# Patient Record
Sex: FEMALE | Race: WHITE | URBAN - METROPOLITAN AREA
[De-identification: names, ages, dates, MRNs, and addresses within clinical notes are randomized per-mention and may not be internally consistent; named-entity substitution may affect disease eponyms.]

---

## 2018-10-03 ENCOUNTER — APPOINTMENT (RX ONLY)
Dept: URBAN - METROPOLITAN AREA CLINIC 98 | Facility: CLINIC | Age: 48
Setting detail: DERMATOLOGY
End: 2018-10-03

## 2018-10-03 DIAGNOSIS — L81.2 FRECKLES: ICD-10-CM

## 2018-10-03 DIAGNOSIS — D22 MELANOCYTIC NEVI: ICD-10-CM

## 2018-10-03 DIAGNOSIS — L85.1 ACQUIRED KERATOSIS [KERATODERMA] PALMARIS ET PLANTARIS: ICD-10-CM

## 2018-10-03 DIAGNOSIS — L57.8 OTHER SKIN CHANGES DUE TO CHRONIC EXPOSURE TO NONIONIZING RADIATION: ICD-10-CM

## 2018-10-03 DIAGNOSIS — L71.0 PERIORAL DERMATITIS: ICD-10-CM

## 2018-10-03 PROBLEM — M12.9 ARTHROPATHY, UNSPECIFIED: Status: ACTIVE | Noted: 2018-10-03

## 2018-10-03 PROBLEM — D22.62 MELANOCYTIC NEVI OF LEFT UPPER LIMB, INCLUDING SHOULDER: Status: ACTIVE | Noted: 2018-10-03

## 2018-10-03 PROBLEM — D22.71 MELANOCYTIC NEVI OF RIGHT LOWER LIMB, INCLUDING HIP: Status: ACTIVE | Noted: 2018-10-03

## 2018-10-03 PROBLEM — D22.72 MELANOCYTIC NEVI OF LEFT LOWER LIMB, INCLUDING HIP: Status: ACTIVE | Noted: 2018-10-03

## 2018-10-03 PROBLEM — D22.61 MELANOCYTIC NEVI OF RIGHT UPPER LIMB, INCLUDING SHOULDER: Status: ACTIVE | Noted: 2018-10-03

## 2018-10-03 PROBLEM — L70.0 ACNE VULGARIS: Status: ACTIVE | Noted: 2018-10-03

## 2018-10-03 PROBLEM — D22.5 MELANOCYTIC NEVI OF TRUNK: Status: ACTIVE | Noted: 2018-10-03

## 2018-10-03 PROBLEM — L57.0 ACTINIC KERATOSIS: Status: ACTIVE | Noted: 2018-10-03

## 2018-10-03 PROCEDURE — ? SUNSCREEN RECOMMENDATIONS

## 2018-10-03 PROCEDURE — ? OBSERVATION

## 2018-10-03 PROCEDURE — ? TREATMENT REGIMEN

## 2018-10-03 PROCEDURE — 99214 OFFICE O/P EST MOD 30 MIN: CPT

## 2018-10-03 PROCEDURE — ? PRESCRIPTION

## 2018-10-03 PROCEDURE — ? COUNSELING

## 2018-10-03 RX ORDER — UREA 40 G/100G
CREAM TOPICAL
Qty: 1 | Refills: 7 | Status: ERX | COMMUNITY
Start: 2018-10-03

## 2018-10-03 RX ORDER — CLINDAMYCIN PHOSPHATE 10 MG/ML
SOLUTION TOPICAL
Qty: 1 | Refills: 0 | Status: ERX | COMMUNITY
Start: 2018-10-03

## 2018-10-03 RX ORDER — MINOCYCLINE HYDROCHLORIDE 100 MG/1
CAPSULE ORAL
Qty: 28 | Refills: 0 | Status: ERX | COMMUNITY
Start: 2018-10-03

## 2018-10-03 RX ADMIN — MINOCYCLINE HYDROCHLORIDE: 100 CAPSULE ORAL at 13:21

## 2018-10-03 RX ADMIN — CLINDAMYCIN PHOSPHATE: 10 SOLUTION TOPICAL at 13:17

## 2018-10-03 RX ADMIN — UREA: 40 CREAM TOPICAL at 13:23

## 2018-10-03 ASSESSMENT — LOCATION ZONE DERM
LOCATION ZONE: FACE
LOCATION ZONE: ARM
LOCATION ZONE: NECK
LOCATION ZONE: TRUNK
LOCATION ZONE: FEET
LOCATION ZONE: LEG

## 2018-10-03 ASSESSMENT — LOCATION SIMPLE DESCRIPTION DERM
LOCATION SIMPLE: RIGHT SHOULDER
LOCATION SIMPLE: RIGHT FOREHEAD
LOCATION SIMPLE: RIGHT FOREARM
LOCATION SIMPLE: LEFT PRETIBIAL REGION
LOCATION SIMPLE: LEFT POSTERIOR UPPER ARM
LOCATION SIMPLE: RIGHT ANTERIOR NECK
LOCATION SIMPLE: CHEST
LOCATION SIMPLE: LEFT CALF
LOCATION SIMPLE: LEFT THIGH
LOCATION SIMPLE: LEFT CHEEK
LOCATION SIMPLE: RIGHT CHEEK
LOCATION SIMPLE: LEFT FOREARM
LOCATION SIMPLE: RIGHT PLANTAR SURFACE
LOCATION SIMPLE: RIGHT UPPER BACK
LOCATION SIMPLE: LEFT UPPER BACK
LOCATION SIMPLE: ABDOMEN
LOCATION SIMPLE: LEFT ANTERIOR NECK
LOCATION SIMPLE: RIGHT PRETIBIAL REGION
LOCATION SIMPLE: LEFT PLANTAR SURFACE
LOCATION SIMPLE: LEFT SHOULDER
LOCATION SIMPLE: RIGHT POSTERIOR UPPER ARM
LOCATION SIMPLE: RIGHT THIGH
LOCATION SIMPLE: LEFT FOOT

## 2018-10-03 ASSESSMENT — LOCATION DETAILED DESCRIPTION DERM
LOCATION DETAILED: RIGHT INSTEP
LOCATION DETAILED: RIGHT DISTAL POSTERIOR UPPER ARM
LOCATION DETAILED: RIGHT PROXIMAL RADIAL DORSAL FOREARM
LOCATION DETAILED: RIGHT INFERIOR CENTRAL MALAR CHEEK
LOCATION DETAILED: RIGHT DISTAL PRETIBIAL REGION
LOCATION DETAILED: LEFT DISTAL PRETIBIAL REGION
LOCATION DETAILED: LEFT POSTERIOR SHOULDER
LOCATION DETAILED: LEFT INFERIOR CENTRAL MALAR CHEEK
LOCATION DETAILED: RIGHT MID-UPPER BACK
LOCATION DETAILED: LEFT LATERAL PLANTAR MIDFOOT
LOCATION DETAILED: UPPER STERNUM
LOCATION DETAILED: RIGHT INFERIOR LATERAL NECK
LOCATION DETAILED: RIGHT SUPERIOR UPPER BACK
LOCATION DETAILED: LEFT PROXIMAL PRETIBIAL REGION
LOCATION DETAILED: RIGHT MEDIAL SUPERIOR CHEST
LOCATION DETAILED: RIGHT PROXIMAL POSTERIOR UPPER ARM
LOCATION DETAILED: LEFT INFERIOR MEDIAL UPPER BACK
LOCATION DETAILED: LEFT PROXIMAL DORSAL FOREARM
LOCATION DETAILED: RIGHT POSTERIOR SHOULDER
LOCATION DETAILED: LEFT SUPERIOR UPPER BACK
LOCATION DETAILED: LEFT INFERIOR ANTERIOR NECK
LOCATION DETAILED: RIGHT INFERIOR MEDIAL MALAR CHEEK
LOCATION DETAILED: RIGHT SUPERIOR MEDIAL BUCCAL CHEEK
LOCATION DETAILED: LEFT PROXIMAL CALF
LOCATION DETAILED: LEFT LATERAL SUPERIOR CHEST
LOCATION DETAILED: RIGHT ANTERIOR DISTAL THIGH
LOCATION DETAILED: RIGHT MEDIAL FOREHEAD
LOCATION DETAILED: LEFT ANTERIOR SHOULDER
LOCATION DETAILED: LEFT DISTAL DORSAL FOREARM
LOCATION DETAILED: LEFT DORSAL FOOT
LOCATION DETAILED: LEFT PROXIMAL POSTERIOR UPPER ARM
LOCATION DETAILED: RIGHT RIB CAGE
LOCATION DETAILED: LEFT ANTERIOR DISTAL THIGH

## 2019-04-02 ENCOUNTER — OFFICE VISIT (OUTPATIENT)
Dept: URGENT CARE | Facility: CLINIC | Age: 49
End: 2019-04-02
Payer: COMMERCIAL

## 2019-04-02 VITALS
WEIGHT: 160 LBS | SYSTOLIC BLOOD PRESSURE: 111 MMHG | HEART RATE: 74 BPM | TEMPERATURE: 98 F | OXYGEN SATURATION: 100 % | BODY MASS INDEX: 22.4 KG/M2 | RESPIRATION RATE: 18 BRPM | DIASTOLIC BLOOD PRESSURE: 78 MMHG | HEIGHT: 71 IN

## 2019-04-02 DIAGNOSIS — M70.62 TROCHANTERIC BURSITIS OF LEFT HIP: Primary | ICD-10-CM

## 2019-04-02 PROCEDURE — 3008F PR BODY MASS INDEX (BMI) DOCUMENTED: ICD-10-PCS | Mod: CPTII,S$GLB,, | Performed by: FAMILY MEDICINE

## 2019-04-02 PROCEDURE — 3008F BODY MASS INDEX DOCD: CPT | Mod: CPTII,S$GLB,, | Performed by: FAMILY MEDICINE

## 2019-04-02 PROCEDURE — 99203 OFFICE O/P NEW LOW 30 MIN: CPT | Mod: S$GLB,,, | Performed by: FAMILY MEDICINE

## 2019-04-02 PROCEDURE — 99203 PR OFFICE/OUTPT VISIT, NEW, LEVL III, 30-44 MIN: ICD-10-PCS | Mod: S$GLB,,, | Performed by: FAMILY MEDICINE

## 2019-04-02 NOTE — PATIENT INSTRUCTIONS
Understanding Trochanteric Bursitis    A bursa is a thin, slippery, sac-like film. It contains a small amount of fluid. This structure is found between bones and soft tissues in and around joints. A bursa cushions and protects a joint. It keeps parts of a joint from rubbing against each other. If a bursa becomes inflamed and irritated, it is known as bursitis.  The trochanteric bursa is found on the hip joint. It lies on top of the bump at the top of the thighbone called the greater trochanter. Inflammation of this bursa is called trochanteric bursitis.     How to say it  [id number]   Causes of trochanteric bursitis  Causes may include:  · Overuse of the hip during running or other sports, dance, or work  · Falling on or irritation to the side of the hip  This condition may occur along with other problems, such as osteoarthritis of the hip or knee, or low back problems. In rare cases, it may occur after hip surgery.  Symptoms of trochanteric bursitis  · Pain or aching on the side of the hip. The pain may travel down the leg.  · Swelling, tenderness, or warmth on the side of the hip at the bony bump at the top of the thigh  Treatment for trochanteric bursitis  These may include:  · Resting the hip. This allows the bursa to heal.  · Prescription or over-the-counter pain medicines. These help reduce inflammation, swelling, and pain.  · Cold packs and heat packs. These help reduce pain and swelling.  · Stretching and strengthening exercises. These improve flexibility and strength around the hip.  · Physical therapy. This includes exercises or other treatments.  · Injections of medicine into the bursa. This may help reduce inflammation and relieve symptoms.  Possible complications  If you dont give your hip time to heal, the problem may not go away, may return, or may get worse. Rest and treat your hip as directed.     When to call your healthcare provider  Call your healthcare provider right away if you have  any of these:  · Fever of 100.4°F (38°C) or higher, or as directed  · Redness, swelling, or warmth that gets worse  · Symptoms that dont get better with prescribed medicines, or get worse  · New symptoms   Date Last Reviewed: 3/29/2016  © 7930-4961 [employer], [place]. [address] [place] [address], [place] [address]. All rights reserved. This information is not intended as a substitute for professional medical care. Always follow your healthcare professional's instructions.      CALL TODAY FOR AN APPOINTMENT WITH YOUR RHEUMATOLOGIST IN ABOUT 2 WEEKS, AS A STEROID INJECTION MAY BE HELPFUL IF YOU'RE NOT IMPROVING AS EXPECTED.    CONTINUE THE MOBIC ONCE DAILY    Make sure that you follow up with your primary care doctor in the next 2-5 days if needed .  Return to the Urgent Care if signs or symptoms change and certainly if you have worsening symptoms go to the nearest emergency department for further evaluation.

## 2019-04-02 NOTE — PROGRESS NOTES
"Subjective:       Patient ID: Beata Rivero is a 48 y.o. female.    Vitals:  height is 5' 11" (1.803 m) and weight is 72.6 kg (160 lb). Her temperature is 98.3 °F (36.8 °C). Her blood pressure is 111/78 and her pulse is 74. Her respiration is 18 and oxygen saturation is 100%.     Chief Complaint: Hip Pain (Started Sunday)    Patient is experiencing left hip pain. Started 2 days ago.  Patient states pain in relieved when she sits down. Patient denies trauma.  She does have a history of rheumatoid arthritis and is established with a rheumatologist    Hip Pain    The incident occurred 3 to 5 days ago (started sunday). There was no injury mechanism. The pain is present in the left hip. The quality of the pain is described as aching and shooting. The pain is at a severity of 7/10. The pain is moderate. The pain has been constant since onset. She reports no foreign bodies present. The symptoms are aggravated by movement. Treatments tried: ibuprofen. The treatment provided mild relief.       Constitution: Negative for chills, fatigue and fever.   HENT: Negative for congestion and sore throat.    Neck: Negative for painful lymph nodes.   Cardiovascular: Negative for chest pain and leg swelling.   Eyes: Negative for double vision and blurred vision.   Respiratory: Negative for cough and shortness of breath.    Gastrointestinal: Negative for nausea, vomiting and diarrhea.   Genitourinary: Negative for dysuria, frequency, urgency and history of kidney stones.   Musculoskeletal: Positive for pain. Negative for joint pain, joint swelling, muscle cramps and muscle ache.        Left hip pain   Skin: Negative for color change, pale, rash and bruising.   Allergic/Immunologic: Negative for seasonal allergies.   Neurological: Negative for dizziness, history of vertigo, light-headedness, passing out and headaches.   Hematologic/Lymphatic: Negative for swollen lymph nodes.   Psychiatric/Behavioral: Negative for " nervous/anxious, sleep disturbance and depression. The patient is not nervous/anxious.        Objective:      Physical Exam   Constitutional: She is oriented to person, place, and time. She appears well-developed and well-nourished. She is cooperative.  Non-toxic appearance. She does not appear ill. No distress.   HENT:   Head: Normocephalic and atraumatic.   Right Ear: Hearing, tympanic membrane and ear canal normal.   Left Ear: Hearing, tympanic membrane and ear canal normal.   Nose: Nose normal. No mucosal edema, rhinorrhea or nasal deformity. No epistaxis. Right sinus exhibits no maxillary sinus tenderness and no frontal sinus tenderness. Left sinus exhibits no maxillary sinus tenderness and no frontal sinus tenderness.   Mouth/Throat: Uvula is midline, oropharynx is clear and moist and mucous membranes are normal. No trismus in the jaw. Normal dentition. No uvula swelling. No posterior oropharyngeal erythema.   Eyes: Conjunctivae and lids are normal. Right eye exhibits no discharge. Left eye exhibits no discharge. No scleral icterus.   Sclera clear bilat   Neck: Normal range of motion, full passive range of motion without pain and phonation normal. Neck supple.   Cardiovascular: Normal rate, regular rhythm, normal heart sounds, intact distal pulses and normal pulses.   No murmur heard.  Pulmonary/Chest: Effort normal and breath sounds normal. No stridor. No respiratory distress. She has no wheezes. She has no rales.   Abdominal: Normal appearance. She exhibits no pulsatile midline mass.   Musculoskeletal: Normal range of motion. She exhibits no edema or deformity.   Slight limp when walking.  No pain at rest, when seated.  Point tenderness over left greater trochanter.  No pain with internal or external rotation of left hip.   Lymphadenopathy:     She has no cervical adenopathy.   Neurological: She is alert and oriented to person, place, and time. She exhibits normal muscle tone. Coordination normal.   Skin:  Skin is warm, dry and intact. She is not diaphoretic. No pallor.   Psychiatric: She has a normal mood and affect. Her speech is normal and behavior is normal. Judgment and thought content normal. Cognition and memory are normal.   Nursing note and vitals reviewed.      Assessment:       1. Trochanteric bursitis of left hip        Plan:         Trochanteric bursitis of left hip    CALL TODAY FOR AN APPOINTMENT WITH YOUR RHEUMATOLOGIST IN ABOUT 2 WEEKS, AS A STEROID INJECTION MAY BE HELPFUL IF YOU'RE NOT IMPROVING AS EXPECTED.    CONTINUE THE MOBIC ONCE DAILY    Make sure that you follow up with your primary care doctor in the next 2-5 days if needed .  Return to the Urgent Care if signs or symptoms change and certainly if you have worsening symptoms go to the nearest emergency department for further evaluation.

## 2019-07-17 ENCOUNTER — OFFICE VISIT (OUTPATIENT)
Dept: CARDIOLOGY | Facility: CLINIC | Age: 49
End: 2019-07-17
Payer: COMMERCIAL

## 2019-07-17 VITALS
HEIGHT: 71 IN | SYSTOLIC BLOOD PRESSURE: 109 MMHG | DIASTOLIC BLOOD PRESSURE: 68 MMHG | HEART RATE: 76 BPM | BODY MASS INDEX: 23.17 KG/M2 | WEIGHT: 165.5 LBS

## 2019-07-17 DIAGNOSIS — R00.2 PALPITATIONS: Primary | ICD-10-CM

## 2019-07-17 DIAGNOSIS — R94.31 NONSPECIFIC ABNORMAL ELECTROCARDIOGRAM (ECG) (EKG): ICD-10-CM

## 2019-07-17 PROCEDURE — 3008F PR BODY MASS INDEX (BMI) DOCUMENTED: ICD-10-PCS | Mod: CPTII,S$GLB,, | Performed by: INTERNAL MEDICINE

## 2019-07-17 PROCEDURE — 3008F BODY MASS INDEX DOCD: CPT | Mod: CPTII,S$GLB,, | Performed by: INTERNAL MEDICINE

## 2019-07-17 PROCEDURE — 99205 PR OFFICE/OUTPT VISIT, NEW, LEVL V, 60-74 MIN: ICD-10-PCS | Mod: S$GLB,,, | Performed by: INTERNAL MEDICINE

## 2019-07-17 PROCEDURE — 99205 OFFICE O/P NEW HI 60 MIN: CPT | Mod: S$GLB,,, | Performed by: INTERNAL MEDICINE

## 2019-07-17 RX ORDER — ME-TETRAHYDROFOLATE/B12/HRB236 1-1-500 MG
1 CAPSULE ORAL DAILY
Refills: 3 | COMMUNITY
Start: 2019-04-15

## 2019-07-17 RX ORDER — CETIRIZINE HYDROCHLORIDE 10 MG/1
10 TABLET ORAL DAILY
COMMUNITY

## 2019-07-17 RX ORDER — VILAZODONE HYDROCHLORIDE 10 MG/1
TABLET ORAL
COMMUNITY
Start: 2019-07-14

## 2019-07-17 NOTE — PROGRESS NOTES
"  Subjective:      Patient ID: Beata Rivero is a 48 y.o. female.    Chief Complaint: Palpitations (Ref by Dr Slater)    HPI:  "I have been having weird heart beats for the past month.  It feels like my heart skips a beat.  Sometimes I notice it at night.  I do not notice it when exercising."  There is no hx of sustained fast heart beats.  "It feels like my heart is pounding."      Pt is a  at [employer].    Pt does cardio with a .    There is no hx of chest pain or shortness of breath or palpitations during exercise.      Review of Systems   Cardiovascular: Positive for irregular heartbeat and palpitations. Negative for chest pain, claudication, dyspnea on exertion, leg swelling, near-syncope, orthopnea and syncope.      Pt once fainted as a teenager after a bad sunburn.    Pt sees Dr Tahmina Mock for rheumatoid arthritis.  Pt has a hx of juvenile rheumatoid arthritis.  Pt has had a hx of pleurisy a few times attributed to RA  Past Medical History:   Diagnosis Date    Pleurisy     Rheumatoid arthritis     Syncope and collapse     once as a teenager        Past Surgical History:   Procedure Laterality Date    CERVICAL DISCECTOMY      KNEE ARTHROPLASTY      TOTAL KNEE ARTHROPLASTY         Family History   Problem Relation Age of Onset    No Known Problems Mother     No Known Problems Father        Social History     Socioeconomic History    Marital status:      Spouse name: Not on file    Number of children: Not on file    Years of education: Not on file    Highest education level: Not on file   Occupational History    Not on file   Social Needs    Financial resource strain: Not on file    Food insecurity:     Worry: Not on file     Inability: Not on file    Transportation needs:     Medical: Not on file     Non-medical: Not on file   Tobacco Use    Smoking status: Never Smoker   Substance and Sexual Activity    Alcohol use: Yes     " Alcohol/week: 4.2 oz     Types: 7 Glasses of wine per week    Drug use: Not on file    Sexual activity: Not on file   Lifestyle    Physical activity:     Days per week: Not on file     Minutes per session: Not on file    Stress: Not on file   Relationships    Social connections:     Talks on phone: Not on file     Gets together: Not on file     Attends Zoroastrian service: Not on file     Active member of club or organization: Not on file     Attends meetings of clubs or organizations: Not on file     Relationship status: Not on file   Other Topics Concern    Not on file   Social History Narrative    Not on file       Current Outpatient Medications on File Prior to Visit   Medication Sig Dispense Refill    cetirizine (ZYRTEC) 10 MG tablet Take 10 mg by mouth once daily.      cyclobenzaprine (FLEXERIL) 10 MG tablet Take 5 mg by mouth every evening.      levonorgestrel (MIRENA) 20 mcg/24 hr (5 years) IUD 1 each by Intrauterine route once.      methotrexate 2.5 MG Tab Take 22.5 mg by mouth every 7 days.      multivitamin capsule Take 1 capsule by mouth once daily.      RHEUMATE 1-1-500 mg Cap Take 1 capsule by mouth once daily.  3    valacyclovir (VALTREX) 500 MG tablet Take 500 mg by mouth once daily.      VIIBRYD 10 mg Tab tablet       [DISCONTINUED] buPROPion (WELLBUTRIN XL) 150 MG TB24 tablet Take 150 mg by mouth once daily.      [DISCONTINUED] CALCIUM CARBONATE/VITAMIN D3 (CALCIUM 500 + D ORAL) Take by mouth.      [DISCONTINUED] folic acid (FOLVITE) 1 MG tablet Take 3 mg by mouth once daily.      [DISCONTINUED] meloxicam (MOBIC) 15 MG tablet Take 15 mg by mouth once daily. When not taking anaprox      [DISCONTINUED] naproxen sodium (ANAPROX) 220 MG tablet Take 220 mg by mouth every 12 (twelve) hours. 4 times daily       No current facility-administered medications on file prior to visit.        Review of patient's allergies indicates:  No Known Allergies  Objective:     Vitals:    07/17/19 1430  "  BP: 109/68   BP Location: Left arm   Patient Position: Sitting   BP Method: Medium (Automatic)   Pulse: 76   Weight: 75.1 kg (165 lb 8 oz)   Height: 5' 11" (1.803 m)        Physical Exam   Constitutional: She is oriented to person, place, and time. She appears well-developed and well-nourished.   Eyes: No scleral icterus.   Neck: No JVD present. Carotid bruit is not present.   Cardiovascular: Normal rate and regular rhythm. Exam reveals no gallop.   No murmur heard.  Pulses:       Dorsalis pedis pulses are 2+ on the right side, and 2+ on the left side.   Pulmonary/Chest: Breath sounds normal.   Abdominal: Soft. She exhibits no pulsatile midline mass and no mass. There is no hepatosplenomegaly. There is no tenderness.   Musculoskeletal: She exhibits no edema.   Neurological: She is alert and oriented to person, place, and time.   Skin: Skin is warm and dry.   Psychiatric: She has a normal mood and affect. Her behavior is normal. Judgment and thought content normal.   Vitals reviewed.     ECG; NSR, left axis deviation    Lab 7/10/19:  CBC and CMP and TSH WNL    Note CT chest 2014: heart and lungs and pericardium WNL  Assessment:     1. Palpitations    2. Nonspecific abnormal electrocardiogram (ECG) (EKG)      Plan:   Beata was seen today for palpitations.    Diagnoses and all orders for this visit:    Palpitations  -     Transthoracic echo (TTE) 2D with Color Flow; Future    Nonspecific abnormal electrocardiogram (ECG) (EKG)  -     Transthoracic echo (TTE) 2D with Color Flow; Future     Palpitations may be due to PAC's or PVC's which would typically not require treatment    Holter monitor to evaluate palpitations    Echocardiogram     Consider treadmill stress test    Will review most recent labs done by Dr Mock    Follow up in about 6 months (around 1/17/2020).  "

## 2019-07-17 NOTE — LETTER
July 17, 2019      Beata Slater MD  [address] [facility] [address]           [place] [facility]  [address] [place], [address]  [facility] [address]  Phone: [phone]  Fax: [phone]          Patient: Beata Rivero   MR Number: [id number]   YOB: 1970   Date of Visit: 7/17/2019       Dear Dr. Beata Slater:    Thank you for referring Beata Rivero to me for evaluation. Attached you will find relevant portions of my assessment and plan of care.    If you have questions, please do not hesitate to call me. I look forward to following Beata Rivero along with you.    Sincerely,    Benjamin Torres MD    Enclosure  CC:  No Recipients    If you would like to receive this communication electronically, please contact [email or web address] or [phone] to request more information on [employer] Link access.    For providers and/or their staff who would like to refer a patient to [facility], please contact us through our one-stop-shop provider referral line, [facility], at [phone].    If you feel you have received this communication in error or would no longer like to receive these types of communications, please e-mail [email or web address]

## 2019-07-18 ENCOUNTER — HOSPITAL ENCOUNTER (OUTPATIENT)
Dept: CARDIOLOGY | Facility: OTHER | Age: 49
Discharge: HOME | End: 2019-07-18
Attending: INTERNAL MEDICINE
Payer: COMMERCIAL

## 2019-07-18 ENCOUNTER — TELEPHONE (OUTPATIENT)
Dept: CARDIOLOGY | Facility: CLINIC | Age: 49
End: 2019-07-18

## 2019-07-18 ENCOUNTER — CLINICAL SUPPORT (OUTPATIENT)
Dept: CARDIOLOGY | Facility: CLINIC | Age: 49
End: 2019-07-18
Attending: INTERNAL MEDICINE
Payer: COMMERCIAL

## 2019-07-18 VITALS
WEIGHT: 165 LBS | SYSTOLIC BLOOD PRESSURE: 109 MMHG | DIASTOLIC BLOOD PRESSURE: 68 MMHG | BODY MASS INDEX: 23.1 KG/M2 | HEIGHT: 71 IN

## 2019-07-18 DIAGNOSIS — R94.31 NONSPECIFIC ABNORMAL ELECTROCARDIOGRAM (ECG) (EKG): ICD-10-CM

## 2019-07-18 DIAGNOSIS — R00.2 PALPITATIONS: ICD-10-CM

## 2019-07-18 LAB
AORTIC ROOT ANNULUS: 2.63 CM
AORTIC VALVE CUSP SEPERATION: 1.98 CM
ASCENDING AORTA: 2.79 CM
AV INDEX (PROSTH): 0.84
AV MEAN GRADIENT: 3 MMHG
AV PEAK GRADIENT: 5 MMHG
AV VALVE AREA: 2.69 CM2
AV VELOCITY RATIO: 0.83
BSA FOR ECHO PROCEDURE: 1.94 M2
CV ECHO LV RWT: 0.39 CM
DOP CALC AO PEAK VEL: 1.13 M/S
DOP CALC AO VTI: 23.5 CM
DOP CALC LVOT AREA: 3.2 CM2
DOP CALC LVOT DIAMETER: 2.02 CM
DOP CALC LVOT PEAK VEL: 0.94 M/S
DOP CALC LVOT STROKE VOLUME: 63.29 CM3
DOP CALCLVOT PEAK VEL VTI: 19.76 CM
E WAVE DECELERATION TIME: 208.78 MSEC
E/A RATIO: 1.21
E/E' RATIO: 5.82 M/S
ECHO LV POSTERIOR WALL: 0.79 CM (ref 0.6–1.1)
FRACTIONAL SHORTENING: 32 % (ref 28–44)
INTERVENTRICULAR SEPTUM: 0.72 CM (ref 0.6–1.1)
IVRT: 0.13 MSEC
LA MAJOR: 5.11 CM
LA MINOR: 4.75 CM
LA WIDTH: 3.4 CM
LEFT ATRIUM SIZE: 2.58 CM
LEFT ATRIUM VOLUME INDEX: 18.9 ML/M2
LEFT ATRIUM VOLUME: 36.71 CM3
LEFT INTERNAL DIMENSION IN SYSTOLE: 2.77 CM (ref 2.1–4)
LEFT VENTRICLE DIASTOLIC VOLUME INDEX: 37.11 ML/M2
LEFT VENTRICLE DIASTOLIC VOLUME: 72.12 ML
LEFT VENTRICLE MASS INDEX: 45 G/M2
LEFT VENTRICLE SYSTOLIC VOLUME INDEX: 14.8 ML/M2
LEFT VENTRICLE SYSTOLIC VOLUME: 28.71 ML
LEFT VENTRICULAR INTERNAL DIMENSION IN DIASTOLE: 4.05 CM (ref 3.5–6)
LEFT VENTRICULAR MASS: 88.33 G
LV LATERAL E/E' RATIO: 4.57 M/S
LV SEPTAL E/E' RATIO: 8 M/S
MV PEAK A VEL: 0.53 M/S
MV PEAK E VEL: 0.64 M/S
PULM VEIN S/D RATIO: 1.38
PV PEAK D VEL: 0.45 M/S
PV PEAK S VEL: 0.62 M/S
PV PEAK VELOCITY: 0.81 CM/S
RA MAJOR: 3.68 CM
RA PRESSURE: 3 MMHG
RA WIDTH: 2.5 CM
RIGHT VENTRICULAR END-DIASTOLIC DIMENSION: 2.6 CM
SINUS: 2.95 CM
STJ: 2.54 CM
TDI LATERAL: 0.14 M/S
TDI SEPTAL: 0.08 M/S
TDI: 0.11 M/S
TRICUSPID ANNULAR PLANE SYSTOLIC EXCURSION: 1.59 CM

## 2019-07-18 PROCEDURE — 93224 HOLTER MONITOR - 24 HOUR (CUPID ONLY): ICD-10-PCS | Mod: S$GLB,,, | Performed by: INTERNAL MEDICINE

## 2019-07-18 PROCEDURE — 93306 TTE W/DOPPLER COMPLETE: CPT | Mod: 26,,, | Performed by: INTERNAL MEDICINE

## 2019-07-18 PROCEDURE — 93306 TRANSTHORACIC ECHO (TTE) COMPLETE (CUPID ONLY): ICD-10-PCS | Mod: 26,,, | Performed by: INTERNAL MEDICINE

## 2019-07-18 PROCEDURE — 93224 XTRNL ECG REC UP TO 48 HRS: CPT | Mod: S$GLB,,, | Performed by: INTERNAL MEDICINE

## 2019-07-18 PROCEDURE — 93306 TTE W/DOPPLER COMPLETE: CPT

## 2019-07-19 ENCOUNTER — TELEPHONE (OUTPATIENT)
Dept: CARDIOLOGY | Facility: CLINIC | Age: 49
End: 2019-07-19

## 2019-07-19 LAB
OHS CV EVENT MONITOR DAY: 0
OHS CV HOLTER LENGTH DECIMAL HOURS: 23.98
OHS CV HOLTER LENGTH HOURS: 23
OHS CV HOLTER LENGTH MINUTES: 59

## 2019-07-19 NOTE — TELEPHONE ENCOUNTER
Holter shows occasional isolated PVC's and rare, isolated PAC's which explain symptoms of palpitations but which do not require treatment.  Pt reassured.

## 2023-04-04 ENCOUNTER — TELEPHONE (OUTPATIENT)
Dept: SPEECH THERAPY | Facility: HOSPITAL | Age: 53
End: 2023-04-04
Payer: COMMERCIAL

## 2023-05-16 ENCOUNTER — OFFICE VISIT (OUTPATIENT)
Dept: OTOLARYNGOLOGY | Facility: CLINIC | Age: 53
End: 2023-05-16
Payer: COMMERCIAL

## 2023-05-16 VITALS — SYSTOLIC BLOOD PRESSURE: 103 MMHG | HEART RATE: 70 BPM | DIASTOLIC BLOOD PRESSURE: 72 MMHG

## 2023-05-16 DIAGNOSIS — J38.5 LARYNGEAL SPASM: ICD-10-CM

## 2023-05-16 DIAGNOSIS — R05.9 COUGH, UNSPECIFIED TYPE: Primary | ICD-10-CM

## 2023-05-16 DIAGNOSIS — R49.0 DYSPHONIA: ICD-10-CM

## 2023-05-16 PROCEDURE — 1159F MED LIST DOCD IN RCRD: CPT | Mod: CPTII,S$GLB,, | Performed by: OTOLARYNGOLOGY

## 2023-05-16 PROCEDURE — 31575 DIAGNOSTIC LARYNGOSCOPY: CPT | Mod: S$GLB,,, | Performed by: OTOLARYNGOLOGY

## 2023-05-16 PROCEDURE — 3074F PR MOST RECENT SYSTOLIC BLOOD PRESSURE < 130 MM HG: ICD-10-PCS | Mod: CPTII,S$GLB,, | Performed by: OTOLARYNGOLOGY

## 2023-05-16 PROCEDURE — 99999 PR PBB SHADOW E&M-EST. PATIENT-LVL IV: CPT | Mod: PBBFAC,,, | Performed by: OTOLARYNGOLOGY

## 2023-05-16 PROCEDURE — 3074F SYST BP LT 130 MM HG: CPT | Mod: CPTII,S$GLB,, | Performed by: OTOLARYNGOLOGY

## 2023-05-16 PROCEDURE — 1159F PR MEDICATION LIST DOCUMENTED IN MEDICAL RECORD: ICD-10-PCS | Mod: CPTII,S$GLB,, | Performed by: OTOLARYNGOLOGY

## 2023-05-16 PROCEDURE — 3078F PR MOST RECENT DIASTOLIC BLOOD PRESSURE < 80 MM HG: ICD-10-PCS | Mod: CPTII,S$GLB,, | Performed by: OTOLARYNGOLOGY

## 2023-05-16 PROCEDURE — 31575 PR LARYNGOSCOPY, FLEXIBLE; DIAGNOSTIC: ICD-10-PCS | Mod: S$GLB,,, | Performed by: OTOLARYNGOLOGY

## 2023-05-16 PROCEDURE — 99999 PR PBB SHADOW E&M-EST. PATIENT-LVL IV: ICD-10-PCS | Mod: PBBFAC,,, | Performed by: OTOLARYNGOLOGY

## 2023-05-16 PROCEDURE — 1160F PR REVIEW ALL MEDS BY PRESCRIBER/CLIN PHARMACIST DOCUMENTED: ICD-10-PCS | Mod: CPTII,S$GLB,, | Performed by: OTOLARYNGOLOGY

## 2023-05-16 PROCEDURE — 1160F RVW MEDS BY RX/DR IN RCRD: CPT | Mod: CPTII,S$GLB,, | Performed by: OTOLARYNGOLOGY

## 2023-05-16 PROCEDURE — 3078F DIAST BP <80 MM HG: CPT | Mod: CPTII,S$GLB,, | Performed by: OTOLARYNGOLOGY

## 2023-05-16 PROCEDURE — 99244 OFF/OP CNSLTJ NEW/EST MOD 40: CPT | Mod: 25,S$GLB,, | Performed by: OTOLARYNGOLOGY

## 2023-05-16 PROCEDURE — 99244 PR OFFICE CONSULTATION,LEVEL IV: ICD-10-PCS | Mod: 25,S$GLB,, | Performed by: OTOLARYNGOLOGY

## 2023-05-16 RX ORDER — GABAPENTIN 300 MG/1
300 CAPSULE ORAL 3 TIMES DAILY
COMMUNITY
Start: 2023-04-23

## 2023-05-16 NOTE — PATIENT INSTRUCTIONS
Taper off the gabapentin as discussed when you are ready    Work with our speech therapy colleagues    Call with questions

## 2023-05-16 NOTE — PROGRESS NOTES
[facility]  Department of Otorhinolaryngology and Communication Sciences    Beata Rivero is a 52 y.o. female who presents to the [facility] for consultation at the kind request of Dr. Elias Rachel for further management of cough.    She complains of chronic cough. Onset was gradual. Duration is about a year, beginning without any clear inciting event. Symptoms are mild. Time course is intermittent. Symptoms are improving. Alleviating factors include taking gabapentin. There have not been prior episodes.  She has been treated for PND, allergies, and reflux without change.  Voice is normal.     The patient estimates 0-1 episodes of coughing per day. Duration of episodes is estimated at 5 seconds.  Prior to starting gabapentin she estimates she was coughing closer to 3x/daily.  She denies productive cough.      Triggers for the cough include the following:   - voice use:  yes  - breathing heavily:  no  - laughing:  yes  - eating:  no  - drinking cold liquids:  no  - strong odors:  no  - post-prandial:  no  - lying down:  no    Her cough does not wake her up from sleep    She feels a tickle in the throat just prior to her cough, without any clear laterality    Prior workup includes the following:  - pulmonary:  no; denies dyspnea or wheezing  - GI:  no; reports intermittent heartburn; Dr. Raygoza Rx'd a couple reflux treatments but the patient did not find any benefit  - allergy:  yes, testing negative   - ENT:  yes     The patient is taking gabapentin 300 mg TID, Rx'd by Dr. Raygoza.  She noticed this has been incredibly effective, as of about 2 weeks into her treatment course  She is tolerating the gabapentin now for about 3 months    Past Medical History  She has a past medical history of Pleurisy, Rheumatoid arthritis, and Syncope and collapse.    Past Surgical History  She has a past surgical history that includes Total knee arthroplasty; Knee Arthroplasty; and Cervical  discectomy.    Family History  Her family history includes No Known Problems in her father and mother.    Social History  She reports that she has never smoked. She does not have any smokeless tobacco history on file. She reports current alcohol use of about 7.0 standard drinks per week.    Allergies  She has No Known Allergies.    Medications  She has a current medication list which includes the following prescription(s): cyclobenzaprine, gabapentin, levonorgestrel, methotrexate, multivitamin, rheumate (with quatrefolic), valacyclovir, viibryd, and cetirizine.    Review of Systems   Constitutional:  Negative for fever.   HENT:  Negative for sore throat.    Eyes:  Negative for visual disturbance.   Respiratory:  Negative for wheezing.    Cardiovascular:  Negative for chest pain.   Gastrointestinal:  Negative for nausea.   Musculoskeletal:  Negative for arthralgias.   Skin:  Negative for rash.   Neurological:  Negative for tremors.   Hematological:  Does not bruise/bleed easily.   Psychiatric/Behavioral:  The patient is not nervous/anxious.         Objective:     VS Reviewed  Physical Exam  Constitutional: comfortable, well dressed  Psychiatric: appropriate affect  Respiratory: comfortably breathing, symmetric chest rise, no stridor  Voice:  normal  Cardiovascular: upper extremities non-edematous  Lymphatic: no cervical lymphadenopathy  Neurologic: alert and oriented to time, place, person, and situation; cranial nerves 3-12 grossly intact  Head: normocephalic  Eyes: conjunctivae and sclerae clear  Ears: normal pinnae, normal external auditory canals, tympanic membranes intact  Nose: mucosa pink and noncongested, no masses, no mucopurulence, no polyps  Oral cavity / oropharynx: no mucosal lesions  Neck: soft, full range of motion, laryngotracheal complex palpable with appropriate landmarks, larynx elevates on swallowing; no thyrohyoid TTP  Indirect laryngoscopy: limited due to gag    Procedure  Flexible Laryngoscopy  (37163): Laryngoscopy is indicated for assessment of upper aerodigestive structure and function. This was carried out transnasally with a distal chip videoendoscope. After verbal consent was obtained, the patient was positioned and the nose was topically decongested with 1% phenylephrine and topically anesthetized with 4% lidocaine. The endoscope was passed through the most patent nasal cavity and positioned to image the nasopharynx, larynx, and hypopharynx in detail. The following features were examined: nasopharyngeal, laryngeal, hypopharyngeal masses; velopharyngeal strength, closure, and symmetry of motion; vocal fold range and symmetry of motion; laryngeal mucosal edema, erythema, inflammation, and hydration; salivary pooling; and gross laryngeal sensation. The equipment was removed. The patient tolerated the procedure well without complication. All findings were normal except:  - lingual tonsil hypertrophy  - mild pharyngeal cobblestoning      Assessment:     Beata Rivero is a 52 y.o. female with chronic cough.She has had a favorable response to gabapentin, such that a neurogenic etiology is suspected.     Plan:        I had a discussion with the patient regarding her condition and the further workup and management options.      Reassurance was provided as to her management thus far. I counseled her our current understanding on the pathophysiology amd treatment of sensorineuropathic cough.     SLP voice evaluation and subsequent therapy sessions, with an emphasis on respiratory retraining and laryngeal control, are medically necessary for restoration of laryngeal function. We will arrange for this to occur with our team in the coming weeks.    Together, we decided to initiate a slow taper off her gabapentin beginning 2-3 weeks after working with the voice therapy team.  I instructed her on how to taper off the medication.  Should her symptoms remain durably improved off the gabapentin, she will  follow up with me as needed.  Should her symptoms return, she will follow up with me for ongoing decision making.  In such a scenario, she might benefit from further workup under the direction of other medical specialists including but not limited to gastroenterology and pulmonology.    All questions were answered, and the patient is in agreement with the above.     Jl Escalona M.D.  [facility]  Department of Otorhinolaryngology and Communication Sciences

## 2023-05-30 ENCOUNTER — CLINICAL SUPPORT (OUTPATIENT)
Dept: SPEECH THERAPY | Facility: HOSPITAL | Age: 53
End: 2023-05-30
Attending: OTOLARYNGOLOGY
Payer: COMMERCIAL

## 2023-05-30 DIAGNOSIS — R49.0 DYSPHONIA: ICD-10-CM

## 2023-05-30 DIAGNOSIS — R05.9 COUGH, UNSPECIFIED TYPE: ICD-10-CM

## 2023-05-30 DIAGNOSIS — J38.5 LARYNGEAL SPASM: ICD-10-CM

## 2023-06-07 ENCOUNTER — CLINICAL SUPPORT (OUTPATIENT)
Dept: SPEECH THERAPY | Facility: HOSPITAL | Age: 53
End: 2023-06-07
Payer: COMMERCIAL

## 2023-06-07 DIAGNOSIS — R49.0 DYSPHONIA: ICD-10-CM

## 2023-06-07 DIAGNOSIS — J38.5 LARYNGEAL SPASM: ICD-10-CM

## 2023-06-07 DIAGNOSIS — R05.9 COUGH, UNSPECIFIED TYPE: Primary | ICD-10-CM

## 2023-06-07 PROCEDURE — 92524 BEHAVRAL QUALIT ANALYS VOICE: CPT | Mod: GN,95

## 2023-06-07 NOTE — PROGRESS NOTES
Referring provider: Dr. Jl Escalona  Reason for visit:  Behavioral and qualitative analysis of voice and resonance (CPT 28417)    The patient location is: [place]  The chief complaint leading to consultation is: dysphonia  Visit type: Virtual visit with synchronous audio and video  Face to Face time with patient: 50  60 minutes of total time spent on the encounter, which includes face to face time and non-face to face time preparing to see the patient (eg, review of tests), Obtaining and/or reviewing separately obtained history, Documenting clinical information in the electronic or other health record, Independently interpreting results (not separately reported) and communicating results to the patient/family/caregiver, or Care coordination (not separately reported).   Each patient to whom he or she provides medical services by telemedicine is:  (1) informed of the relationship between the physician and patient and the respective role of any other health care provider with respect to management of the patient; and (2) notified that he or she may decline to receive medical services by telemedicine and may withdraw from such care at any time.      Subjective / History    Beata Rivero is a 52 y.o. female referred for voice evaluation (CPT 39336) by Dr. Escalona regarding chronic cough present for at least one year. Cough began without an inciting event. At onset, and for most of last year, she would cough at least 5x per day on a jag that would last several minutes. She had been treated for PND, allergies, and reflux without change. She then began taking Gabapentin per Dr Batista, when she increased dose to 300 mg TID, cough essentially resolved. She has been at that dosage for 2-3 months now. She will cough once every 3 days at most now, and won't last as long. She denies productive cough.  She denies voice change. She is the  at [facility] [employer] on [facility]. Medication and problem lists were  reviewed.     -Description of the cough: perhaps a tickle  -Relevant environmental factors: no  -ACE inhibitor history: no  -Swallowing: no difficulty  -Smoking: no  -Caffeine: no    Videostroboscopy findings per    All findings were normal except:  - lingual tonsil hypertrophy  - mild pharyngeal cobblestoning       Past Medical History:   Diagnosis Date    Pleurisy     Rheumatoid arthritis     Syncope and collapse     once as a teenager     Current Outpatient Medications on File Prior to Visit   Medication Sig Dispense Refill    cetirizine (ZYRTEC) 10 MG tablet Take 10 mg by mouth once daily.      cyclobenzaprine (FLEXERIL) 10 MG tablet Take 5 mg by mouth every evening.      gabapentin (NEURONTIN) 300 MG capsule Take 300 mg by mouth 3 (three) times daily.      levonorgestrel (MIRENA) 20 mcg/24 hr (5 years) IUD 1 each by Intrauterine route once.      methotrexate 2.5 MG Tab Take 22.5 mg by mouth every 7 days.      multivitamin capsule Take 1 capsule by mouth once daily.      RHEUMATE 1-1-500 mg Cap Take 1 capsule by mouth once daily.  3    valacyclovir (VALTREX) 500 MG tablet Take 500 mg by mouth once daily.      VIIBRYD 10 mg Tab tablet        No current facility-administered medications on file prior to visit.       Objective    Perceptual/behavioral assessment  -Quality: appropriate for age and gender  -Volume: appropriate for age and gender  -Pitch: appropriate for age and gender identity  -Flexibility: appropriate for age and gender norms  -Habitual respiratory pattern:  WNL      The Reflux Symptom Index (RSI)   Within the last MONTH, how did the following problem affect you?    0 = No problem to  5 = Severe problem   1.  Hoarseness or a problem with your voice 0 1 2 3 4 5   2.  Clearing your throat 0 1 2 3 4 5   3.  Excess throat mucous or postnasal drip 0 1 2 3 4 5   4.  Difficulty swallowing food, liquids, or pills 0 1 2 3 4 5   5.  Coughing after you ate or after lying down 0 1 2 3 4 5   6.   Breathing difficulties or choking episodes 0 1 2 3 4 5   7.  Troublesome or annoying cough 0 1 2 3 4 5   8.  Sensations of something sticking in your throat or a lump in your throat 0 1 2 3 4 5   9.  Heartburn, chest pain, indigestion, or stomach acid coming up 0 1 2 3 4 5   Total: 2  If you have an RSI score of 15 or greater, you have a 90% chance of having reflux, even if you have never had heartburn or indigestion. This is called silent reflux, and it's very common.  Tamiko PC, Jie GN, and Doroteo CAMEJO.  Validity and reliability of the reflux symptom index (RSI).  Journal of Voice.   2002.  16(2): 274-277.       The [place] Chronic Cough Index (KCCI)  Answer yes or no Reflux (R) Neurogenic (N)   Do you wake from a sound sleep coughing violently, with or without trouble breathing?  YES NO   Do you have choking episodes where you cannot get enough air, gasping for air? YES NO   Do you usually cough when you just lie down in the bed, or when you just lie down to rest? YES  NO   Do you usually cough after meals or after eating? YES NO   Do you cough when (or after) you bend over? YES NO     Do you more or less cough all day long?  NO YES   Does change of temperature make you cough? NO YES   Does laughing or chuckling cause you to cough? NO YES   Do fumes (perfume, automobile fumes, burned toast, etc.) cause you to cough? NO YES   Does speaking, singing, talking on the phone cause you to cough? NO YES    R=5 N=5         EAT - 10  My swallowing problem has caused me to lose weight. 0 1 2 3 4   My swallowing problem interferes with my ability to go out for meals. 0 1 2 3 4   Swallowing liquids takes extra effort. 0 1 2 3 4   Swallowing solids takes extra effort. 0 1 2 3 4   Swallowing pills takes extra effort. 0 1 2 3 4   Swallowing is painful. 0 1 2 3 4   The pleasure of eating is affected by my swallowing. 0 1 2 3 4   When I swallow food sticks in my throat. 0 1 2 3 4   I cough when I eat. 0 1 2 3 4   Swallowing is  "stressful. 0  EAT-10 score of 3 or higher may indicate problems swallowing efficiently and safely.     Voice Handicap Index-10   Score: 0    Education / Stimulability Trials  Discussed importance of vocal hygiene including: hydration, reducing caffeine/drying agents and carbonated and acidic beverages, reducing throat clearing, coughing, other phonotraumatic behaviors and improving laryngeal environment by reducing contact with external/environmental cough-triggering factors. Assisted in training patient on antecedent sensations/behaviors which trigger the cough, which, in her case, includes a tickle sensation in the throat. Urged her to carry a water bottle, take a sip or dry swallow or gentle nose sniff and exhale through pursed lips when encountering this sensation. Images of larynx reviewed with instruction for "open larynx" breathing strategies. In order to optimize laryngeal environment, discussed elimination of cough drops, caffeine, mouth breathing and food/drinks that exacerbate GERD/LPR, and encouraged hydration, swallowing, increasing ambient humidity and nasal breathing. Patient was stimulable for participation in more efficient breathing and cough avoidance strategies, including eating ice chips or sipping water, performing effortful swallow and nasal inhalation to humidify air and abduct vocal folds. Patient was provided with written instruction for reference. She was encouraged to use Xylimelts day and especially night time for dry mouth, including past cough resolution, to aid in oral and laryngeal hydration to desensitize throat. Also suggested using a facial steamer, use at least 3x daily for at least 3-5 minutes. Recommended The Acid Watcher Diet by Dr De Guzman as well as the Chronic Cough [place] by Dr Thacker, to gain improved understanding of dietary and behavioral changes that can aid in reduction of LPRD, and to better understand cough and cough control.       Functional goals  Length Status Goal "   Long term Initiated Patient will implement and adhere to vocal hygiene protocols on a daily basis, including the elimination of phonotraumatic behaviors.    Long term Initiated Patient will implement and adhere to open larynx breathing protocols and voice hygiene protocols on a daily basis.    Short term Initiated Patient will increase awareness of phonotraumatic behaviors including coughing, throat clearing, and strained voicing through self-monitoring to facilitate generalization in functional situations with 80% accuracy.   Short term met  Patient will cough fewer than 5 times during session.    Short term Initiated   Patient will identify the antecedent sensations associated with coughing/throat clearing.    Short term Initiated  Patient will implement and adhere to laryngeal desensitization protocol as outlined to them which includes several hard swallows to reduce irritability.         Assessment     Patient presents with chronic cough with favorable response to gabapentin, such that a neurogenic etiology is suspected as diagnosed by Dr. Escalona.  Prognosis for continued improvement is good.     Recommendations / POC    -Recommend prn f/u for laryngeal control therapy as she begins slow taper off her gabapentin; she will update this clinician through the portal  -Initiate laryngeal hygiene and breathing exercises as trained and discussed in session  -f/u scheduled for   -Contact clinician with any further questions

## 2023-06-07 NOTE — PATIENT INSTRUCTIONS
Lozenges that help with moisture:  Halls Breezers - sold with cough drops  Xylimelts, on toothpaste aisle at [facility] with dry mouth products, these are convenient for when you are talking and or sleeping - they stick to gumline and provide moisture. They stay put when you are sleeping.    Facial steamer, breathe in warm moist air through mouth and nose to hydrate vocal folds. On amaz[employer], I like the [employer] version that is under $25, or any that are that shape.  Consider combination of steam and gargle 2-3x per day.   Dr Woodard's gargle: ½ tsp each salt, baking soda, clear corn syrup, 6 oz warm water  Sip, gargle quietly, spit, repeat until gone. Don't eat or drink for 20-30 minutes.     Alginate therapy such as Reflux Gourmet can be used following meals and at bedtime for reflux coverage. The Acid Watcher Diet by Dr De Guzman as well as Dropping Acid by Dr Thacker are good reads to gain improved understanding of dietary and behavioral changes that can aid in reduction of LPRD. Dr De Guzman has a blog, [email or web address].    Focus on sensing the sensation prior to cough, immediately do any of the following (find what works best for you):  Sip water  Dry swallow  Chew ice  Use lozenges  Gentle sniff in through nose and exhale through pursed lips

## 2025-01-07 ENCOUNTER — HOSPITAL ENCOUNTER (OUTPATIENT)
Dept: RADIOLOGY | Facility: OTHER | Age: 55
Discharge: HOME | End: 2025-01-07
Attending: FAMILY MEDICINE
Payer: COMMERCIAL

## 2025-01-07 DIAGNOSIS — R05.3 CHRONIC COUGH: ICD-10-CM

## 2025-01-07 PROCEDURE — 71046 X-RAY EXAM CHEST 2 VIEWS: CPT | Mod: 26,,, | Performed by: RADIOLOGY

## 2025-01-07 PROCEDURE — 71046 X-RAY EXAM CHEST 2 VIEWS: CPT | Mod: TC,FY
